# Patient Record
Sex: FEMALE
[De-identification: names, ages, dates, MRNs, and addresses within clinical notes are randomized per-mention and may not be internally consistent; named-entity substitution may affect disease eponyms.]

---

## 2018-05-15 ENCOUNTER — HOSPITAL ENCOUNTER (INPATIENT)
Dept: HOSPITAL 31 - C.EROB | Age: 28
LOS: 2 days | Discharge: HOME | End: 2018-05-17
Attending: OBSTETRICS & GYNECOLOGY | Admitting: OBSTETRICS & GYNECOLOGY
Payer: MEDICAID

## 2018-05-15 VITALS — BODY MASS INDEX: 0.4 KG/M2

## 2018-05-15 DIAGNOSIS — Z3A.40: ICD-10-CM

## 2018-05-15 DIAGNOSIS — O48.0: ICD-10-CM

## 2018-05-15 DIAGNOSIS — D64.9: ICD-10-CM

## 2018-05-15 DIAGNOSIS — O09.33: ICD-10-CM

## 2018-05-15 LAB
ALBUMIN SERPL-MCNC: 3.1 G/DL (ref 3.5–5)
ALBUMIN/GLOB SERPL: 0.9 {RATIO} (ref 1–2.1)
ALT SERPL-CCNC: 15 U/L (ref 9–52)
AST SERPL-CCNC: 19 U/L (ref 14–36)
BACTERIA #/AREA URNS HPF: (no result) /[HPF]
BASOPHILS # BLD AUTO: 0 K/UL (ref 0–0.2)
BASOPHILS NFR BLD: 0.4 % (ref 0–2)
BILIRUB UR-MCNC: NEGATIVE MG/DL
BUN SERPL-MCNC: 8 MG/DL (ref 7–17)
CALCIUM SERPL-MCNC: 8.5 MG/DL (ref 8.6–10.4)
EOSINOPHIL # BLD AUTO: 0 K/UL (ref 0–0.7)
EOSINOPHIL NFR BLD: 0.1 % (ref 0–4)
ERYTHROCYTE [DISTWIDTH] IN BLOOD BY AUTOMATED COUNT: 17.7 % (ref 11.5–14.5)
GFR NON-AFRICAN AMERICAN: > 60
GLUCOSE UR STRIP-MCNC: NORMAL MG/DL
HGB BLD-MCNC: 8.2 G/DL (ref 11–16)
LEUKOCYTE ESTERASE UR-ACNC: (no result) LEU/UL
LYMPHOCYTES # BLD AUTO: 1.7 K/UL (ref 1–4.3)
LYMPHOCYTES NFR BLD AUTO: 24.2 % (ref 20–40)
MCH RBC QN AUTO: 21.8 PG (ref 27–31)
MCHC RBC AUTO-ENTMCNC: 30.7 G/DL (ref 33–37)
MCV RBC AUTO: 70.9 FL (ref 81–99)
MONOCYTES # BLD: 0.4 K/UL (ref 0–0.8)
MONOCYTES NFR BLD: 5.3 % (ref 0–10)
NEUTROPHILS # BLD: 5 K/UL (ref 1.8–7)
NEUTROPHILS NFR BLD AUTO: 70 % (ref 50–75)
NRBC BLD AUTO-RTO: 0.1 % (ref 0–2)
PH UR STRIP: 6 [PH] (ref 5–8)
PLATELET # BLD: 301 K/UL (ref 130–400)
PMV BLD AUTO: 9.4 FL (ref 7.2–11.7)
PROT UR STRIP-MCNC: (no result) MG/DL
RAPID PLASMA REAGIN: NONREACTIVE
RBC # BLD AUTO: 3.74 MIL/UL (ref 3.8–5.2)
RBC # UR STRIP: (no result) /UL
SP GR UR STRIP: 1.03 (ref 1–1.03)
SQUAMOUS EPITHIAL: 65 /HPF (ref 0–5)
URATE SERPL-MCNC: 4 MG/DL (ref 2.2–7.5)
UROBILINOGEN UR-MCNC: 4 MG/DL (ref 0.2–1)
WBC # BLD AUTO: 7.1 K/UL (ref 4.8–10.8)

## 2018-05-15 RX ADMIN — Medication SCH TAB: at 18:46

## 2018-05-15 NOTE — OBADHP
===========================

Datetime: 05/15/2018 02:25

===========================

   

Admit Comment, IP Provider:  39 y/o   @ ? 40.1 wks by LMP however no prental care. pt marixa 
[place] and never gets a prenatal care , no us, no bloodowkr nda all p
regancies have been fine with no problems. P tpreorts laboring contraction pian at home since 9pm, dn
ies vb, lof, +FM, dnies headache, blurry visin, ruq/epigastric pain

      

   /90 elevted

   VE; /-2 vtx intact

    

   A/P 39 y/o  ? 40= wks NO PRENATAL CARE in labor

   -admit to L+D

   -npo, ivf

   -admissin and preeclamptic labs

   -vs perprotocol of elevated bp

   -dating sonogram

   -declined pain manamgent

   -r/b/a/i of prenatal care dw patietn

      

Pelvic Type - PN:  Adequate

Extremities - PN:  Normal

Abdomen - PN:  Normal

Back - PN:  Normal

Breast - PN:  Not Done

Lungs - PN:  Normal

Heart - PN:  Normal

Thyroid - PN:  Not Done

Neurologic - PN:  Normal

HEENT - PN:  Normal

General - PN:  Normal

Fetal Presentation-Admit:  Vertex

FHR - Baseline A Provider:  150

Membranes, Provider:  Intact

Contraction Comments Provider:  irregular

Gestation - Est Wks by US:  ?40.1

IP Prenatal Hx Assessment:  No Prenatal Care

IP Chief Complaint:  Uterine contractions

NICHD Variability Prov Fetus A:  Moderate 6-25bpm

FHR Category Provider Fetus A:  Category I

NICHD Decel Fetus A IP Provider:  None

Dilatation, Provider:  5

Effacement, Provider:  60

Station, Provider:  -2

Genitourinary Exam:  Normal

DTRs - PN:  Normal

EGA AdmitDate IP:  40.1

IP Admit Plan:  Admit to unit

## 2018-05-15 NOTE — US
EXAM:

  US Biophysical Profile Without Non-Stress Testing



EXAM DATE/TIME:

  5/15/2018 2:38 AM



CLINICAL HISTORY:

  27 years old, female; Pain; Pain indication: Contractions; Pregnant; 

Additional info: No prenatal care, supected 40+wk, lga needs dating



TECHNIQUE:

  Real-time ultrasound of the maternal pelvis for fetal biophysical profile 

evaluation with image documentation.



COMPARISON:

  No relevant prior studies available.



FINDINGS:

  There is a single live intrauterine fetus in vertex presentation.  Estimated 

gestational age is 39 weeks 2 days.  Estimated due date is 05/20/2018. 

Estimated gestational age by dates there is 40 weeks 1 day.  Estimated fetal 

weight is 3787 g (8 lbs. 6 oz.), at approximately 62nd percentile.  Fetal heart 

rate is 158 beats per minute.  Placenta is anterior, no previa.  Amniotic fluid 

volume is normal, IVAN of 14.65.  Cervix is not visualized.



  BPD: 9.4 cm, 38 weeks 3 days

  HC: 33.8 cm, previously 8 weeks 5 days

  AC: 35.7 cm, 39 weeks 4 days

  FL: 7.85 cm, 40 weeks 1 day



  The visualized fetal parts include fluid filled stomach and bladder, very 

limited kidneys.



  Biophysical profile was performed with following scores:



  Fetal tone                     2

  Fetal breathing             2

  Fetal movements         2

  Amniotic fluid                2



  Total score is 8 of 8.



IMPRESSION:   

1.  Single live intrauterine fetus at 29 weeks 2 days.

2.  Normal biophysical profile score.

## 2018-05-15 NOTE — OBDS
===================================

DELIVERY PERSONNEL

===================================

   

Delivery Doctor:  OSIEL Farrell MD

Circulator:  Torey Trevino RN

   

===================================

MATERNAL INFORMATION

===================================

   

Delivery Anesthesia:  None

Medications in Delivery:  PITOCIN 20 UNITS

Estimated  Blood Loss (ml):  100

Placenta Cultured:  No

Maternal Complications:  Other

Other Maternal Complications:  Grand multip, No PNC, unknown GBS, Anemia Hgb 8.2, obesity  

Provider Comments:  pt had US and c/o pushing, fully dilated pushed precipioutsly delivery of head wt
ih shoulders folloew by body. meconum noted. umbilca cord clamped and cut. cord blood and cord gases 
colleated adn sent x 2. spontnaoeu delivery of intact placenta with membrnea. fundus firm, good hemos
taiss, no lacerations, intact perienum.

      

   live female infant

   weight of 9lb 12 ounces

   ebwl 100ml

   no complicationss

   

===================================

LABOR SUMMARY

===================================

   

EDC:  2018 00:00

No. Babies in Womb:  1

 Attempted:  No

Labor Anesthesia:  None

   

===================================

LABOR INFORMATION

===================================

   

Reason for Induction:  Not Applicable

Onset of Labor:  2018 23:00

Complete Dilatation:  05/15/2018 05:23

Oxytocin:  N/A

Group B Beta Strep:  Done, Result Unknown

Antibiotics # of Doses:  2

Antibiotics Time of Last Dose:  0500

Steroids Given:  None

Reason Steroids Not Administered:  Not Applicable

   

===================================

MEMBRANES

===================================

   

Membranes Rupture Method:  Spontaneous

Rupture of Membranes:  05/15/2018 05:23

Length of Rupture (hrs):  0.00

Amniotic Fluid Color:  Heavy Meconium

Amniotic Fluid Amount:  Moderate

Amniotic Fluid Odor:  Normal

   

===================================

STAGES OF LABOR

===================================

   

Stage 1 hrs:  6

Stage 1 min:  23

Stage 2 hrs:  0

Stage 2 min:  0

Stage 3 hrs:  0

Stage 3 min:  2

Total Time in Labor hrs:  6

Total Time in Labor min:  25

   

===================================

VAGINAL DELIVERY

===================================

   

Episiotomy:  None

Laceration Extension:  N/A

Laceration Type:  None

Laceration Repair:  Not Applicable

Initial Vag Sponge Count:  10

Final Vag Sponge Count:  10

Initial Vag Sharps Count:  0

Final Vag Sharps Count:  0

Sponge Count Correct:  Yes; Vaginal Sweep Performed

Sharps Count Correct:  N/A

   

===================================

BABY A INFORMATION

===================================

   

Infant Delivery Date/Time:  05/15/2018 05:23

Method of Delivery:  Vaginal

Born in Route :  No

:  N/A

   

===================================

SHOULDER DYSTOCIA BABY A

===================================

   

Infant Delivery Date/Time:  05/15/2018 05:23

   

===================================

PRESENTATION/POSITION BABY A

===================================

   

Presentation:  Cephalic

Cephalic Presentation:  Vertex

Vertex Position:  Left Occipital Anterior

   

===================================

PLACENTA INFORMATION BABY A

===================================

   

Placenta Delivery Time :  05/15/2018 05:25

Placenta Method of Delivery:  Spontaneous

Placenta Status:  Delivered

   

===================================

APGAR SCORES BABY A

===================================

   

Heart Rate 1 min:  >100 bpm

Resp Effort 1 min:  Good Cry

Reflex Irritability 1 min:  Cough or Sneeze or Pulls Away

Muscle Tone 1 min:  Active Motion

Color 1 min:  Body Pink, Extremities Blue

APGAR SCORE 1 MIN:  9

Heart Rate 5 min:  >100 bpm

Resp Effort 5 min:  Good Cry

Reflex Irritability 5 min:  Cough or Sneeze or Pulls Away

Muscle Tone 5 min:  Active Motion

Color 5 min:  Body Pink, Extremities Blue

APGAR SCORE 5 MIN:  9

   

===================================

INFANT INFORMATION BABY A

===================================

   

Gestational Age at Delivery:  40.0

Gestational Status:  Term

Infant Outcome :  Liveborn

Infant Condition :  Stable

Infant Sex:  Female

   

===================================

IDENTIFICATION/MEDS BABY A

===================================

   

ID Band Number:  [id number]

ID Band Location:  Right Leg; Right Arm



Sensor Applied:  Yes

Sensor Number:  [id number]

Sensor Location :  Cord Clamp

Vitamin K Given :  Aquamephyton 1 mg IM

Erythromycin Given:  Given Both Eyes

   

===================================

WEIGHT/LENGTH BABY A

===================================

   

Infant Birthweight (gms):  4430

Infant Weight (lb):  9

Infant Weight (oz):  12

Infant Length Inches:  21.00

Infant Length cms:  53.3

   

===================================

CORD INFORMATION BABY A

===================================

   

No. Cord Vessels:  3

Nuchal Cord :  N/A

Cord Blood Taken:  Yes

Infant Suction:  Mouth

   

===================================

ASSESSMENT BABY A

===================================

   

Infant Complications:  Meconium

Physical Findings at Delivery:  Within Normal Limits

Infant Respirations:  Appears Normal

Neonatologist/ALS Called :  No

Infant Care By:  BRENDA REYE

Transferred To:  Remains with Mother

## 2018-05-16 LAB
BASOPHILS # BLD AUTO: 0 K/UL (ref 0–0.2)
BASOPHILS NFR BLD: 0.5 % (ref 0–2)
EOSINOPHIL # BLD AUTO: 0.1 K/UL (ref 0–0.7)
EOSINOPHIL NFR BLD: 0.8 % (ref 0–4)
ERYTHROCYTE [DISTWIDTH] IN BLOOD BY AUTOMATED COUNT: 17.8 % (ref 11.5–14.5)
HGB BLD-MCNC: 8.6 G/DL (ref 11–16)
LYMPHOCYTES # BLD AUTO: 2.3 K/UL (ref 1–4.3)
LYMPHOCYTES NFR BLD AUTO: 32.5 % (ref 20–40)
MCH RBC QN AUTO: 22.5 PG (ref 27–31)
MCHC RBC AUTO-ENTMCNC: 31.8 G/DL (ref 33–37)
MCV RBC AUTO: 70.7 FL (ref 81–99)
MONOCYTES # BLD: 0.3 K/UL (ref 0–0.8)
MONOCYTES NFR BLD: 4.5 % (ref 0–10)
NEUTROPHILS # BLD: 4.4 K/UL (ref 1.8–7)
NEUTROPHILS NFR BLD AUTO: 61.7 % (ref 50–75)
NRBC BLD AUTO-RTO: 0 % (ref 0–2)
PLATELET # BLD: 319 K/UL (ref 130–400)
PMV BLD AUTO: 8.8 FL (ref 7.2–11.7)
RBC # BLD AUTO: 3.82 MIL/UL (ref 3.8–5.2)
WBC # BLD AUTO: 7.2 K/UL (ref 4.8–10.8)

## 2018-05-16 RX ADMIN — Medication SCH TAB: at 10:32

## 2018-05-16 NOTE — OBPPN
===========================

Datetime: 2018 15:03

===========================

   

PP Pain Prov:  Within normal limits

PP Nausea Prov:  Denies

PP Flatus Prov:  Yes

PP BM Prov:  No

PP Heart Prov:  Normal

PP Lungs Prov:  Normal

PP Abdomen/Uterus Prov:  Normal

PP Lochia Prov:  Normal

PP Extremities Prov:  Normal

PP Breastfeeding Progress Prov:  Normal

PP Comments Phys Exam Prov:  Abdomen: Soft, Non-tender, fundus is firm and 1cm below the umbilicus 

PP Impression Prov:  Normal postpartum progression

PP Plan Prov:  Continue present management

PP Progress Note Prov:  Patient was seen and examined at bedside. Patient reports that she is doing w
ell and has no complaints. Patient admits to moderate lochia, passing flatus, urinating without diffi
culties. Patient is ambulating and tolerating diet. Patient denies fever, chills, nausea, vomiting, a
bdominal pain, dizziness and calf tenderness

      

   Physical Exam:

   Gen: NAD, AAOx3

   Cardio: RRR, +S1, S2

   Pulm: CTA bilaterally 

   Abdomen: Soft, non-tender, +BS , fundus is firm and below the umbilicus

   Ext: No cyanosis, no clubbing and no edema 

      

   VS: WNL, as noted in vs table above

      

   Labs:

   7.1>8.6/26.5<301  7.2>8.6/27.1<319

   O+, rubella pending 

      

   A/P:

   28 y/o [id number] at 40.1 weeks, who is now , S/P , PPD #1

   1. Afebrile, stable 

   2. Pain is well-controlled 

   3. Encourage PO hydration and ambulation 

   4. Encourage breastfeeding 

   5. Anemia: Ferrous sulfate 325mg PO TID 

   6. Continue present management 

   7. Anticipate d/c tomorrow 

      

   Plans and management discussed with attending, Dr. Pablo Allen, , PGY-1

## 2018-05-17 VITALS
OXYGEN SATURATION: 98 % | TEMPERATURE: 98.4 F | DIASTOLIC BLOOD PRESSURE: 79 MMHG | HEART RATE: 72 BPM | SYSTOLIC BLOOD PRESSURE: 127 MMHG | RESPIRATION RATE: 18 BRPM

## 2018-05-17 RX ADMIN — Medication SCH TAB: at 10:45

## 2018-05-17 NOTE — CP.PCM.DIS
Provider





- Provider


Date of Admission: 


05/15/18 02:30





Attending physician: 


Larisa Farrell MD





Time Spent in preparation of Discharge (in minutes): 30





Diagnosis





- Discharge Diagnosis


(1) Normal vaginal delivery


Status: Acute   Onset Date: 14   





Hospital Course





- Lab Results


Lab Results: 


 Micro Results





05/15/18 03:58   Urine   Urine Culture - Final


                            No Growth (<1,000 CFU/ML)





 Most Recent Lab Values











WBC  7.2 K/uL (4.8-10.8)   18  07:34    


 


RBC  3.82 Mil/uL (3.80-5.20)   18  07:34    


 


Hgb  8.6 g/dL (11.0-16.0)  L  18  07:34    


 


Hct  27.1 % (34.0-47.0)  L  18  07:34    


 


MCV  70.7 fL (81.0-99.0)  L  18  07:34    


 


MCH  22.5 pg (27.0-31.0)  L  18  07:34    


 


MCHC  31.8 g/dL (33.0-37.0)  L  18  07:34    


 


RDW  17.8 % (11.5-14.5)  H  18  07:34    


 


Plt Count  319 K/uL (130-400)   18  07:34    


 


MPV  8.8 fL (7.2-11.7)   18  07:34    


 


Neut % (Auto)  61.7 % (50.0-75.0)   18  07:34    


 


Lymph % (Auto)  32.5 % (20.0-40.0)   18  07:34    


 


Mono % (Auto)  4.5 % (0.0-10.0)   18  07:34    


 


Eos % (Auto)  0.8 % (0.0-4.0)   18  07:34    


 


Baso % (Auto)  0.5 % (0.0-2.0)   18  07:34    


 


Neut # (Auto)  4.4 K/uL (1.8-7.0)   18  07:34    


 


Lymph # (Auto)  2.3 K/uL (1.0-4.3)   18  07:34    


 


Mono # (Auto)  0.3 K/uL (0.0-0.8)   18  07:34    


 


Eos # (Auto)  0.1 K/uL (0.0-0.7)   18  07:34    


 


Baso # (Auto)  0.0 K/uL (0.0-0.2)   18  07:34    


 


Sodium  142 mmol/L (132-148)   05/15/18  03:58    


 


Potassium  4.0 mmol/L (3.6-5.2)   05/15/18  03:58    


 


Chloride  109 mmol/L ()  H  05/15/18  03:58    


 


Carbon Dioxide  21 mmol/L (22-30)  L  05/15/18  03:58    


 


Anion Gap  16  (10-20)   05/15/18  03:58    


 


BUN  8 mg/dL (7-17)   05/15/18  03:58    


 


Creatinine  0.5 mg/dL (0.7-1.2)  L  05/15/18  03:58    


 


Est GFR ( Amer)  > 60   05/15/18  03:58    


 


Est GFR (Non-Af Amer)  > 60   05/15/18  03:58    


 


Random Glucose  82 mg/dL ()   05/15/18  03:58    


 


Hemoglobin A1c  6.1 % (4.2-6.5)   18  07:34    


 


Uric Acid  4.0 mg/dL (2.2-7.5)   05/15/18  03:58    


 


Calcium  8.5 mg/dl (8.6-10.4)  L  05/15/18  03:58    


 


Total Bilirubin  0.4 mg/dL (0.2-1.3)   05/15/18  03:58    


 


AST  19 U/L (14-36)   05/15/18  03:58    


 


ALT  15 U/L (9-52)   05/15/18  03:58    


 


Alkaline Phosphatase  182 U/L ()  H  05/15/18  03:58    


 


Lactate Dehydrogenase  374 U/L (313-618)   05/15/18  03:58    


 


Total Protein  6.7 g/dL (6.3-8.3)   05/15/18  03:58    


 


Albumin  3.1 g/dL (3.5-5.0)  L  05/15/18  03:58    


 


Globulin  3.6 gm/dL (2.2-3.9)   05/15/18  03:58    


 


Albumin/Globulin Ratio  0.9  (1.0-2.1)  L  05/15/18  03:58    


 


Urine Color  Jodie  (YELLOW)   05/15/18  03:58    


 


Urine Clarity  Hazy  (Clear)   05/15/18  03:58    


 


Urine pH  6.0  (5.0-8.0)   05/15/18  03:58    


 


Ur Specific Gravity  1.028  (1.003-1.030)   05/15/18  03:58    


 


Urine Protein  2+ mg/dL (NEGATIVE)  H  05/15/18  03:58    


 


Urine Glucose (UA)  Normal mg/dL (Normal)   05/15/18  03:58    


 


Urine Ketones  Negative mg/dL (NEGATIVE)   05/15/18  03:58    


 


Urine Blood  1+  (NEGATIVE)  H  05/15/18  03:58    


 


Urine Nitrate  Negative  (NEGATIVE)   05/15/18  03:58    


 


Urine Bilirubin  Negative  (NEGATIVE)   05/15/18  03:58    


 


Urine Urobilinogen  4.0 mg/dL (0.2-1.0)  H  05/15/18  03:58    


 


Ur Leukocyte Esterase  2+ Earl/uL (Negative)  H  05/15/18  03:58    


 


Urine WBC (Auto)  20 /hpf (0-5)  H  05/15/18  03:58    


 


Urine RBC (Auto)  3 /hpf (0-3)   05/15/18  03:58    


 


Ur Squamous Epith Cells  65 /hpf (0-5)  H  05/15/18  03:58    


 


Urine Bacteria  Occ  (<OCC)  H  05/15/18  03:58    


 


RPR  Nonreactive  (NONREACTIVE)   05/15/18  03:58    


 


Hep Bs Antigen  Negative  (NEGATIVE)   05/15/18  10:28    


 


HIV 1&2 Antibody Screen  Negative  (NEGATIVE)   05/15/18  03:58    


 


Rubella IgG Antibody  Positive  (POSITIVE)   05/15/18  03:58    


 


VZV IgG Antibody  Positive  (POSITIVE)   05/15/18  03:58    


 


Blood Type  O POSITIVE   05/15/18  02:35    


 


Antibody Screen  Negative   05/15/18  02:35    














- Hospital Course


Hospital Course: 





PT is breastfeeeding, tolerating PO diet, ambulating. 





Discharge Exam





- Eye Exam


Eye Exam: Normal appearance





- ENT Exam


ENT Exam: Normal Exam





- Neck Exam


Neck exam: Normal Inspection





- Respiratory Exam


Respiratory Exam: NORMAL BREATHING PATTERN





-  Exam


 Exam: NORMAL INSPECTION


External exam: NORMAL EXTERNAL EXAM


Speculum exam: NORMAL SPECULUM EXAM


Bimanual exam: NORMAL BIMANUAL EXAM





- Back Exam


Back exam: NORMAL INSPECTION





Discharge Plan





- Follow Up Plan


Condition: GOOD


Disposition: HOME/ ROUTINE


Instructions:  Postpartum Depression, Reducing the Risk of Sudden Infant Death 

Syndrome, Jaundice, Babies (DC), [place] Hypoglycemia (DC), Feeding Your Infant

, Your  Baby, Postpartum Bleeding, What to Watch for After You Have a 

Baby

## 2022-06-20 NOTE — OBHP
===========================

Datetime: 05/15/2018 02:25

===========================

   

IP Admit Plan:  Admit to unit

Admit Comment, IP Provider:  39 y/o   @ ? 40.1 wks by LMP however no prental care. pt marixa 
[place] and never gets a prenatal care , no us, no bloodowkr nda all p
regancies have been fine with no problems. P tpreorts laboring contraction pian at home since 9pm, dn
ies vb, lof, +FM, dnies headache, blurry visin, ruq/epigastric pain

      

   /90 elevted

   VE; /-2 vtx intact

    

   A/P 39 y/o  ? 40= wks NO PRENATAL CARE in labor

   -admit to L+D

   -npo, ivf

   -admissin and preeclamptic labs

   -vs perprotocol of elevated bp

   -dating sonogram

   -declined pain manamgent

   -r/b/a/i of prenatal care dw patietn

      

Pelvic Type - PN:  Adequate

Extremities - PN:  Normal

Abdomen - PN:  Normal

Back - PN:  Normal

Breast - PN:  Not Done

Lungs - PN:  Normal

Heart - PN:  Normal

Thyroid - PN:  Not Done

Neurologic - PN:  Normal

HEENT - PN:  Normal

General - PN:  Normal

Fetal Presentation-Admit:  Vertex

FHR - Baseline A Provider:  150

Membranes, Provider:  Intact

Contraction Comments Provider:  irregular

Gestation - Est Wks by US:  ?40.1

IP Prenatal Hx Assessment:  No Prenatal Care

EGA AdmitDate IP:  40.1

IP Chief Complaint:  Uterine contractions

NICHD Variability Prov Fetus A:  Moderate 6-25bpm

FHR Category Provider Fetus A:  Category I

NICHD Decel Fetus A IP Provider:  None

Dilatation, Provider:  5

Effacement, Provider:  60

Station, Provider:  -2

Genitourinary Exam:  Normal

DTRs - PN:  Normal
27 Hour(s) 40 Minute(s)